# Patient Record
(demographics unavailable — no encounter records)

---

## 2025-03-10 NOTE — HISTORY OF PRESENT ILLNESS
[FreeTextEntry1] : 09/08/2023 Cc ed Erectile Dysfunction Patient complains of erectile dysfunction. Onset of dysfunction was several years ago and was gradual in onset. Patient states the nature of difficulty is both attaining and maintaining erection. Full erections occur never. Partial erections occur never. Libido is not affected. Risk factors for ED include antihypertensive medications, hyperlipid. Patient denies history of cardiovascular disease and diabetes mellitus. Patient's expectations as to sexual function good. . Previous treatment of ED includes none  06/26/2024 cc:f/u visit  57 year old male presents with f/u visit started on sildenafil good results  03/05/2025 cc STD Pt is a 57 year old male presenting for STD. Pt reports thinking he got an STD since he has been having unprotected sex. Pt reports dysuria and hematuria for quite a while, yet UA results showed no blood, but pt persists that he has seen blood. Pt reports dysuria went away after being given abx, but irritation persists. Herpes found in STD check, no syphilis. Pt reports bump in genital area. Pt has been sexually active with his new partner for one year. Pt is currently taking herpes medication (valACYclovir 1000 MG)      PSA 2.4 A1C 12

## 2025-03-10 NOTE — ASSESSMENT
[FreeTextEntry1] : Pt is a 57 year old male with dysuria, gross hematuria, herpes, and ED. Discussed with pt herpes today and the multiple potential reasons for its presence. We discussed hematuria today and the multiple potential reasons for its presence. I discussed both benign and malignant etiologies that may explain hematuria. I've also reviewed the workup it is generally recommended for evaluation of gross hematuria with the purposes of ruling out malignancy or other urinary tract pathology that could warrant intervention. I've explained that cystoscopy would be recommended and the reasons for it as well as the risks of bleeding infection and damage to urethra.    A cat scan with and without contrast was ordered and discussed with the patient.  Pt will follow up to review the cat scan and for the cystoscopy.   UA and culture done today Ureaplasma/Mycoplasma hominis Culture Trichomonas Vaginalis Chlamydia/GC Amplification CT Urogram w/wo Cont ordered today cystoscopy ordered today Rx for valACYclovir HCl - 500 MG Oral Tablet Rx for sildenafil\   Patient is being seen today for evaluation and management of a chronic and longitudinal ongoing condition and I am of the primary treating physician.

## 2025-03-10 NOTE — END OF VISIT
[FreeTextEntry4] : This note was written by Yassine Shelton on 03/05/2025 actively solely Raman Cruz M.D. I, Yassine Shelton, am scribing for and in the presence of Raman Cruz M.D. in the following sections HISTORY OF PRESENT ILLNESS, PAST MEDICAL/FAMILY/SOCIAL HISTORY; REVIEW OF SYSTEMS; VITAL SIGNS; PHYSICAL EXAM; ASSESSMENT/PLAN. All medical record entries made by this scribe at , Raman Cruz M.D. direction and personally dictated by me on 03/05/2025. I personally performed the services described in the documentation, reviewed the documentation recorded by the scribe in my presence, and it accurately and completely records my words and actions.

## 2025-05-28 NOTE — END OF VISIT
[FreeTextEntry4] : This note was written by Yassine Shelton on 05/28/2025 actively solely Raman Cruz M.D. I, Yassine Shelton, am scribing for and in the presence of Raman Cruz M.D. in the following sections HISTORY OF PRESENT ILLNESS, PAST MEDICAL/FAMILY/SOCIAL HISTORY; REVIEW OF SYSTEMS; VITAL SIGNS; PHYSICAL EXAM; ASSESSMENT/PLAN. All medical record entries made by this scribe at , Raman Cruz M.D. direction and personally dictated by me on 05/28/2025. I personally performed the services described in the documentation, reviewed the documentation recorded by the scribe in my presence, and it accurately and completely records my words and actions.

## 2025-05-28 NOTE — HISTORY OF PRESENT ILLNESS
[FreeTextEntry1] : 09/08/2023 Cc ed Erectile Dysfunction Patient complains of erectile dysfunction. Onset of dysfunction was several years ago and was gradual in onset. Patient states the nature of difficulty is both attaining and maintaining erection. Full erections occur never. Partial erections occur never. Libido is not affected. Risk factors for ED include antihypertensive medications, hyperlipid. Patient denies history of cardiovascular disease and diabetes mellitus. Patient's expectations as to sexual function good. . Previous treatment of ED includes none  06/26/2024 cc:f/u visit  57 year old male presents with f/u visit started on sildenafil good results  03/05/2025 cc STD Pt is a 57 year old male presenting for STD. Pt reports thinking he got an STD since he has been having unprotected sex. Pt reports dysuria and hematuria for quite a while, yet UA results showed no blood, but pt persists that he has seen blood. Pt reports dysuria went away after being given abx, but irritation persists. Herpes found in STD check, no syphilis. Pt reports bump in genital area. Pt has been sexually active with his new partner for one year. Pt is currently taking herpes medication (valACYclovir 1000 MG)    PSA 2.4 A1C 12    05/28/2025 cc f/u visit Pt is a 58 year old male presenting for f/u visit. Pt reports feeling a tearing/pinching pain deep inside the edge of his penis. Pt reports the pain comes and goes. Pt reports the pain occurs between urination sessions. Pt reports his foreskin had herpes like appearance. Pt reports using protection sometimes during sexual intercourse